# Patient Record
Sex: MALE | Race: WHITE
[De-identification: names, ages, dates, MRNs, and addresses within clinical notes are randomized per-mention and may not be internally consistent; named-entity substitution may affect disease eponyms.]

---

## 2019-01-07 ENCOUNTER — HOSPITAL ENCOUNTER (EMERGENCY)
Dept: HOSPITAL 80 - FED | Age: 29
Discharge: HOME | End: 2019-01-07
Payer: COMMERCIAL

## 2019-01-07 VITALS — SYSTOLIC BLOOD PRESSURE: 108 MMHG | DIASTOLIC BLOOD PRESSURE: 61 MMHG

## 2019-01-07 DIAGNOSIS — E86.9: ICD-10-CM

## 2019-01-07 DIAGNOSIS — R63.4: Primary | ICD-10-CM

## 2019-01-07 DIAGNOSIS — E10.9: ICD-10-CM

## 2019-01-07 LAB — PLATELET # BLD: 270 10^3/UL (ref 150–400)

## 2019-01-07 NOTE — EDPHY
H & P


Stated Complaint: Weight loss, polyurea, polydipsia, high glucose, no Dx.


Time Seen by Provider: 01/07/19 15:42


HPI/ROS: 





CHIEF COMPLAINT:  Abnormal lab





HISTORY OF PRESENT ILLNESS:  Patient is a 28-year-old man who comes to the 

emergency department for more number clinic complaining of an abnormal lab.  He 

states that he has had weight loss over the last 3 months as well as polydipsia 

and polyuria.  They obtained a hemoglobin A1c at the clinic that was 14.  They 

recommended he come to the ER.  He states that he is otherwise asymptomatic 

acutely.  No vomiting.  No abdominal pain.  No fatigue or lethargy.  No fever.


Severity:  Moderate


Modifying factors:  None





REVIEW OF SYSTEMS:


Constitutional:  denies: chills, fever, recent illness, recent injury


EENTM: denies: blurred vision, double vision, nose congestion


Respiratory: denies: cough, shortness of breath


Cardiac: denies: chest pain, irregular heart rate, lightheadedness, palpitations


Gastrointestinal/Abdominal: denies: abdominal pain, diarrhea, nausea, vomiting, 

blood streaked stools


Genitourinary:  See HPI denies: dysuria, frequency, hematuria, pain


Musculoskeletal:  See HPI denies: joint pain, muscle pain


Skin: denies: lesions, rash, jaundice, bruising


Neurological: denies: headache, numbness, paresthesia, tingling, dizziness, 

weakness


Hematologic/Lymphatic: denies: blood clots, easy bleeding, easy bruising


Immunologic/allergic: denies: HIV/AIDS, transplant


 10 systems reviewed and negative except as noted





EXAM:


GENERAL:  Thin and in no acute distress.


HEAD:  Atraumatic, normocephalic.


EYES:  Pupils equal round and reactive to light, extraocular movements intact, 

sclera anicteric, conjunctiva are normal.


ENT:  TMs normal, nares patent, oropharynx clear without exudates.  Moist 

mucous membranes.


NECK:  Normal range of motion, supple without lymphadenopathy or JVD.


LUNGS:  Breath sounds clear to auscultation bilaterally and equal.  No wheezes 

rales or rhonchi.


HEART:  Regular rate and rhythm without murmurs, rubs or gallops.


ABDOMEN:  Soft, nontender, normoactive bowel sounds.  No guarding, no rebound.  

No masses appreciated. 


BACK:  No CVA tenderness, no spinal tenderness, step-offs or deformities


EXTREMITIES:  Normal range of motion, no pitting or edema.  No clubbing or 

cyanosis.


NEUROLOGICAL:  Cranial nerves II through XII grossly intact.  Normal speech, 

normal gait.  5/5 strength, normal movement in all extremities, normal sensation

, normal reflexes


PSYCH:  Normal mood, normal affect.


SKIN:  Warm, dry, normal turgor, no visible rashes or lesions.








Source: Patient


Exam Limitations: No limitations





- Personal History


Current Tetanus/Diphtheria Vaccine: Unsure





- Medical/Surgical History


Hx Asthma: No


Hx Chronic Respiratory Disease: No


Hx Diabetes: No


Hx Cardiac Disease: No


Hx Renal Disease: No


Hx Cirrhosis: No


Hx Alcoholism: No


Hx HIV/AIDS: No


Hx Splenectomy or Spleen Trauma: No


Other PMH: none





- Family History


Significant Family History: No pertinent family hx





- Social History


Smoking Status: Former smoker


Alcohol Use: Sober


Drug Use: None


Constitutional: 


 Initial Vital Signs











Temperature (C)  36.5 C   01/07/19 15:35


 


Heart Rate  104 H  01/07/19 15:35


 


Respiratory Rate  18   01/07/19 15:35


 


Blood Pressure  137/80 H  01/07/19 15:35


 


O2 Sat (%)  94   01/07/19 15:35








 











O2 Delivery Mode               Room Air














Allergies/Adverse Reactions: 


 





No Known Allergies Allergy (Unverified 01/07/19 15:35)


 








Home Medications: 














 Medication  Instructions  Recorded


 


Insulin Glargine [Lantus] 10 unit SC HS #1 ml 01/07/19














Medical Decision Making


ED Course/Re-evaluation: 





4:50 p.m. the patient's glucose is significantly elevated but he is not in DKA.

  He has received a L of fluid.  Will give a 2nd L as well as 5 of IV insulin 

and recheck his glucose.  I did offer admission primarily for diabetic 

teaching.  The patient would prefer to avoid admission because he is currently 

uninsured and a student.  I curb sided the hospitalist service and they agree 

that it is reasonable to discharge him as long as he can follow up with 

endocrinology relatively soon and maybe start him on a small dose of insulin 

initially.  I have paged Endocrinology service however they are not on call.





I spoke with Dr. Means from [facility] for diabetes and he agrees that it is 

safe for the patient to go home and recommends that we simply start him on a 

low dose of Lantus every evening such as 10 units at bedtime.  He also 

recommends that if the patient is able to learn had a check his own blood 

pressure that would also be helpful.  They will see him in the next day or 2 in 

their clinic for further teaching and medication adjustments.  The patient is 

concerned because he does not have insurance but Dr. Means states that this is a 

cash clinic and they caterer towards people who do not have insurance.





6:20 p.m. the patient's glucose has decreased appropriately.  He feels well.  

Will give initial dose of Lantus and continue to observe for another hour.  

Will teach him.  Will allow him to eat.  Will then take fingerstick show him 

this as well.





8:30 p.m. the patient's glucose is remained stable.  He is eating.  He has been 

given Lantus.  He will follow up with the Endocrinology Clinic tomorrow or the 

next day.  He feels comfortable going home at this time.  We discussed 

indications for returning.


Differential Diagnosis: 





Partial list of the Differential diagnosis considered include but were not 

limited to;  hyperglycemia, diabetes, DKA and although unlikely based on the 

history and physical exam, I also considered infection, hyperosmolar.  





- Data Points


Laboratory Results: 


 Laboratory Results





 01/07/19 15:50 





 01/07/19 15:50 





 











  01/07/19 01/07/19 01/07/19





  20:22 18:14 16:55


 


WBC      





    


 


RBC      





    


 


Hgb      





    


 


Hct      





    


 


MCV      





    


 


MCH      





    


 


MCHC      





    


 


RDW      





    


 


Plt Count      





    


 


MPV      





    


 


Neut % (Auto)      





    


 


Lymph % (Auto)      





    


 


Mono % (Auto)      





    


 


Eos % (Auto)      





    


 


Baso % (Auto)      





    


 


Nucleat RBC Rel Count      





    


 


Absolute Neuts (auto)      





    


 


Absolute Lymphs (auto)      





    


 


Absolute Monos (auto)      





    


 


Absolute Eos (auto)      





    


 


Absolute Basos (auto)      





    


 


Absolute Nucleated RBC      





    


 


Immature Gran %      





    


 


Immature Gran #      





    


 


Sodium      





    


 


Potassium      





    


 


Chloride      





    


 


Carbon Dioxide      





    


 


Anion Gap      





    


 


BUN      





    


 


Creatinine      





    


 


Estimated GFR      





    


 


Glucose      





    


 


POC Glucose  305 mg/dL H mg/dL  176 mg/dL H mg/dL  





   ()   ()  


 


Calcium      





    


 


Phosphorus      





    


 


Magnesium      





    


 


Total Bilirubin      





    


 


Conjugated Bilirubin      





    


 


Unconjugated Bilirubin      





    


 


AST      





    


 


ALT      





    


 


Alkaline Phosphatase      





    


 


Total Protein      





    


 


Albumin      





    


 


Urine Color      PALE YELLOW 





    


 


Urine Appearance      CLEAR 





    


 


Urine pH      5.0 





     (5.0-7.5) 


 


Ur Specific Gravity      1.033  H 





     (1.002-1.030) 


 


Urine Protein      NEGATIVE 





     (NEGATIVE) 


 


Urine Ketones      2+  H 





     (NEGATIVE) 


 


Urine Blood      NEGATIVE 





     (NEGATIVE) 


 


Urine Nitrate      NEGATIVE 





     (NEGATIVE) 


 


Urine Bilirubin      NEGATIVE 





     (NEGATIVE) 


 


Urine Urobilinogen      NEGATIVE EU EU





     (0.2-1.0) 


 


Ur Leukocyte Esterase      NEGATIVE 





     (NEGATIVE) 


 


Urine RBC      1-3 /hpf /hpf





     (0-3) 


 


Urine WBC      1-3 /hpf /hpf





     (0-3) 


 


Ur Epithelial Cells      NONE SEEN /lpf /lpf





     (NONE-1+) 


 


Urine Mucus      TRACE /lpf /lpf





     (NONE-1+) 


 


Urine Glucose      3+  H 





     (NEGATIVE) 














  01/07/19 01/07/19





  15:50 15:50


 


WBC    8.56 10^3/uL 10^3/uL





    (3.80-9.50) 


 


RBC    5.68 10^6/uL 10^6/uL





    (4.40-6.38) 


 


Hgb    16.5 g/dL g/dL





    (13.7-17.5) 


 


Hct    47.1 % %





    (40.0-51.0) 


 


MCV    82.9 fL fL





    (81.5-99.8) 


 


MCH    29.0 pg pg





    (27.9-34.1) 


 


MCHC    35.0 g/dL g/dL





    (32.4-36.7) 


 


RDW    12.5 % %





    (11.5-15.2) 


 


Plt Count    270 10^3/uL 10^3/uL





    (150-400) 


 


MPV    10.3 fL fL





    (8.7-11.7) 


 


Neut % (Auto)    66.0 % %





    (39.3-74.2) 


 


Lymph % (Auto)    27.0 % %





    (15.0-45.0) 


 


Mono % (Auto)    5.3 % %





    (4.5-13.0) 


 


Eos % (Auto)    0.7 % %





    (0.6-7.6) 


 


Baso % (Auto)    0.6 % %





    (0.3-1.7) 


 


Nucleat RBC Rel Count    0.0 % %





    (0.0-0.2) 


 


Absolute Neuts (auto)    5.66 10^3/uL 10^3/uL





    (1.70-6.50) 


 


Absolute Lymphs (auto)    2.31 10^3/uL 10^3/uL





    (1.00-3.00) 


 


Absolute Monos (auto)    0.45 10^3/uL 10^3/uL





    (0.30-0.80) 


 


Absolute Eos (auto)    0.06 10^3/uL 10^3/uL





    (0.03-0.40) 


 


Absolute Basos (auto)    0.05 10^3/uL 10^3/uL





    (0.02-0.10) 


 


Absolute Nucleated RBC    0.00 10^3/uL 10^3/uL





    (0-0.01) 


 


Immature Gran %    0.4 % %





    (0.0-1.1) 


 


Immature Gran #    0.03 10^3/uL 10^3/uL





    (0.00-0.10) 


 


Sodium  133 mEq/L L mEq/L  





   (135-145)  


 


Potassium  4.4 mEq/L mEq/L  





   (3.5-5.2)  


 


Chloride  98 mEq/L mEq/L  





   ()  


 


Carbon Dioxide  22 mEq/l mEq/l  





   (22-31)  


 


Anion Gap  13 mEq/L mEq/L  





   (6-14)  


 


BUN  24 mg/dL H mg/dL  





   (7-23)  


 


Creatinine  0.8 mg/dL mg/dL  





   (0.7-1.3)  


 


Estimated GFR  > 60   





   


 


Glucose  519 mg/dL H* mg/dL  





   ()  


 


POC Glucose    





   


 


Calcium  9.3 mg/dL mg/dL  





   (8.5-10.4)  


 


Phosphorus  4.7 mg/dL H mg/dL  





   (2.5-4.5)  


 


Magnesium  1.7 mg/dL mg/dL  





   (1.6-2.3)  


 


Total Bilirubin  0.9 mg/dL mg/dL  





   (0.1-1.4)  


 


Conjugated Bilirubin  0.4 mg/dL mg/dL  





   (0.0-0.5)  


 


Unconjugated Bilirubin  0.5 mg/dL mg/dL  





   (0.0-1.1)  


 


AST  24 IU/L IU/L  





   (17-59)  


 


ALT  46 IU/L IU/L  





   (21-72)  


 


Alkaline Phosphatase  172 IU/L H IU/L  





   ()  


 


Total Protein  6.6 g/dL g/dL  





   (6.3-8.2)  


 


Albumin  4.1 g/dL g/dL  





   (3.5-5.0)  


 


Urine Color    





   


 


Urine Appearance    





   


 


Urine pH    





   


 


Ur Specific Gravity    





   


 


Urine Protein    





   


 


Urine Ketones    





   


 


Urine Blood    





   


 


Urine Nitrate    





   


 


Urine Bilirubin    





   


 


Urine Urobilinogen    





   


 


Ur Leukocyte Esterase    





   


 


Urine RBC    





   


 


Urine WBC    





   


 


Ur Epithelial Cells    





   


 


Urine Mucus    





   


 


Urine Glucose    





   











Medications Given: 


 








Discontinued Medications





Sodium Chloride (Ns)  1,000 mls @ 0 mls/hr IV ONCE ONE; Wide Open


   PRN Reason: Protocol


   Stop: 01/07/19 15:44


   Last Admin: 01/07/19 15:47 Dose:  1,000 mls


Sodium Chloride (Ns)  1,000 mls @ 0 mls/hr IV ONCE ONE; Wide Open


   PRN Reason: Protocol


   Stop: 01/07/19 16:54


   Last Admin: 01/07/19 17:00 Dose:  1,000 mls


Insulin Glargine (Lantus Syringe)  10 units SC HS ONE


   Stop: 01/07/19 18:25


   Last Admin: 01/07/19 19:03 Dose:  10 units


Insulin Human Regular (Humulin R)  5 unit IVP EDNOW ONE


   Stop: 01/07/19 16:54


   Last Admin: 01/07/19 16:59 Dose:  5 units





Point of Care Test Results: 


 Chemistry











  01/07/19 01/07/19





  20:22 18:14


 


POC Glucose  305 mg/dL H mg/dL  176 mg/dL H mg/dL





   ()   () 














Departure





- Departure


Disposition: Home, Routine, Self-Care


Condition: Fair


Instructions:  Type 1 Diabetes in Adults: New Diagnosis (ED)


Additional Instructions: 


Taking Lantus every evening and check your glucose 3 times a day and follow up 

with the endocrinologist as discussed within the next couple of days.


Referrals: 


NONE *PRIMARY CARE P,. [Primary Care Provider] - As per Instructions


Praveen Means MD [Medical Doctor] - 1-2 days without fail


Prescriptions: 


Insulin Glargine [Lantus] 10 unit SC HS #1 ml

## 2019-01-08 NOTE — ASMTCMCOM
CM Note

 

CM Note                       

Notes:

Late entry from 1/7/18:



Pt presented to the ED after being recommended to come in by [facility] at  


re:his recent lab results being abnormal. 



Pt has a new diagnosis of  Type 1 DM in Adults. CM was requested to assist w/ensuring pt gets a 

followup appt asap. 



Followed up today and spoke w/staff at [facility]; they said pt could call in and schedule an appt 

for as early as this afternoon but either way they will get him scheduled in the next couple of 

days. Called pt and relayed info; pt states he has already been in contact with [facility] and they 


are also assisting him w/getting an appt w/an Endocrinologist within his insurance's network. Pt 

states he plans on being seen at [facility] either today or tomorrow to review his new 

diagnosis, insulin administration and BGL checks. 



CM available for further assistance if needed. 

 

Date Signed:  01/08/2019 10:27 AM

Electronically Signed By:Shelbie Nash RN